# Patient Record
Sex: MALE | Race: WHITE | NOT HISPANIC OR LATINO
[De-identification: names, ages, dates, MRNs, and addresses within clinical notes are randomized per-mention and may not be internally consistent; named-entity substitution may affect disease eponyms.]

---

## 2020-05-18 ENCOUNTER — TRANSCRIPTION ENCOUNTER (OUTPATIENT)
Age: 35
End: 2020-05-18

## 2021-03-03 ENCOUNTER — TRANSCRIPTION ENCOUNTER (OUTPATIENT)
Age: 36
End: 2021-03-03

## 2021-04-14 ENCOUNTER — TRANSCRIPTION ENCOUNTER (OUTPATIENT)
Age: 36
End: 2021-04-14

## 2021-04-29 ENCOUNTER — EMERGENCY (EMERGENCY)
Facility: HOSPITAL | Age: 36
LOS: 0 days | Discharge: HOME | End: 2021-04-29
Attending: EMERGENCY MEDICINE
Payer: COMMERCIAL

## 2021-04-29 VITALS
TEMPERATURE: 98 F | HEIGHT: 67 IN | HEART RATE: 95 BPM | SYSTOLIC BLOOD PRESSURE: 157 MMHG | OXYGEN SATURATION: 97 % | WEIGHT: 147.05 LBS | RESPIRATION RATE: 19 BRPM | DIASTOLIC BLOOD PRESSURE: 94 MMHG

## 2021-04-29 DIAGNOSIS — Z98.890 OTHER SPECIFIED POSTPROCEDURAL STATES: ICD-10-CM

## 2021-04-29 DIAGNOSIS — R10.11 RIGHT UPPER QUADRANT PAIN: ICD-10-CM

## 2021-04-29 PROCEDURE — 71045 X-RAY EXAM CHEST 1 VIEW: CPT | Mod: 26

## 2021-04-29 PROCEDURE — 74177 CT ABD & PELVIS W/CONTRAST: CPT | Mod: 26

## 2021-04-29 PROCEDURE — 99284 EMERGENCY DEPT VISIT MOD MDM: CPT

## 2021-04-29 PROCEDURE — 71045 X-RAY EXAM CHEST 1 VIEW: CPT

## 2021-04-29 PROCEDURE — 74177 CT ABD & PELVIS W/CONTRAST: CPT

## 2021-04-29 PROCEDURE — 99284 EMERGENCY DEPT VISIT MOD MDM: CPT | Mod: 25

## 2021-04-29 NOTE — ED PROVIDER NOTE - OBJECTIVE STATEMENT
pt presents with right upper quadrant pain intermittent achy worse with deep breath 8 days post op liver biospy [facility] sent home same day neg for acute pathology w/u 2/2 chornidc slight elevelated liver enzymes with h/o nml mri and u/s per pt to [facility]. no fevers no sob. no urinalry f/ud.

## 2021-04-29 NOTE — ED PROVIDER NOTE - PATIENT PORTAL LINK FT
You can access the FollowMyHealth Patient Portal offered by [facility] by registering at the following website: [email or web address]. By joining [employer]’s FollowMyHealth portal, you will also be able to view your health information using other applications (apps) compatible with our system.

## 2021-04-29 NOTE — ED ADULT TRIAGE NOTE - CHIEF COMPLAINT QUOTE
liver biopsy last tuesday, woke up tonight with sharp pain to right side abdomen worse with deep inspiration. was told to come to ED for imaging. history of elevated LFTs, ulcerative colitis, rule out primary sclerosing cholangitis.   hepatologist: dr hylton.

## 2021-09-14 ENCOUNTER — TRANSCRIPTION ENCOUNTER (OUTPATIENT)
Age: 36
End: 2021-09-14

## 2021-12-11 ENCOUNTER — TRANSCRIPTION ENCOUNTER (OUTPATIENT)
Age: 36
End: 2021-12-11

## 2022-02-13 ENCOUNTER — EMERGENCY (EMERGENCY)
Facility: HOSPITAL | Age: 37
LOS: 0 days | Discharge: HOME | End: 2022-02-13
Attending: EMERGENCY MEDICINE
Payer: COMMERCIAL

## 2022-02-13 VITALS
DIASTOLIC BLOOD PRESSURE: 73 MMHG | HEART RATE: 64 BPM | OXYGEN SATURATION: 100 % | SYSTOLIC BLOOD PRESSURE: 126 MMHG | TEMPERATURE: 99 F | RESPIRATION RATE: 17 BRPM

## 2022-02-13 VITALS — HEIGHT: 67 IN | WEIGHT: 145.06 LBS

## 2022-02-13 DIAGNOSIS — S09.90XA UNSPECIFIED INJURY OF HEAD, INITIAL ENCOUNTER: ICD-10-CM
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
## 2022-02-13 DIAGNOSIS — S06.0X9A CONCUSSION WITH LOSS OF CONSCIOUSNESS OF UNSPECIFIED DURATION, INITIAL ENCOUNTER: ICD-10-CM

## 2022-02-13 DIAGNOSIS — R51.9 HEADACHE, UNSPECIFIED: ICD-10-CM
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
## 2022-02-13 PROCEDURE — 99284 EMERGENCY DEPT VISIT MOD MDM: CPT

## 2022-02-13 PROCEDURE — 70450 CT HEAD/BRAIN W/O DYE: CPT | Mod: MA

## 2022-02-13 PROCEDURE — 99284 EMERGENCY DEPT VISIT MOD MDM: CPT | Mod: 25

## 2022-02-13 PROCEDURE — 70450 CT HEAD/BRAIN W/O DYE: CPT | Mod: 26,MA

## 2022-02-13 RX ORDER — ACETAMINOPHEN 500 MG
1000 TABLET ORAL ONCE
Refills: 0 | Status: COMPLETED | OUTPATIENT
Start: 2022-02-13 | End: 2022-02-13

## 2022-02-13 NOTE — ED STATDOCS - CLINICAL SUMMARY MEDICAL DECISION MAKING FREE TEXT BOX
Likely concussive. Given persistent symptoms, recurrent trauma, and exertion, will CT head. Much less suspicious for subarachnoid. Dispo pending imaging. Anticipate discharge home. Likely concussive. Given persistent symptoms, recurrent trauma, and exertion, will CT head. Do not suspect SAH.  Pt very well appearing.  Dispo pending imaging. Anticipate discharge home.

## 2022-02-13 NOTE — ED STATDOCS - NSFOLLOWUPINSTRUCTIONS_ED_ALL_ED_FT
Concussion    WHAT YOU NEED TO KNOW:    A concussion is a mild brain injury. It is usually caused by a bump or blow to the head from a fall, a motor vehicle crash, or a sports injury. Sometimes being shaken forcefully may cause a concussion.    DISCHARGE INSTRUCTIONS:    Have someone call 911 for any of the following:     Someone tries to wake you and cannot do so.      You have a seizure, increasing confusion, or a change in personality.      Your speech becomes slurred, or you have new vision problems.    Return to the emergency department if:     You have sudden and new vision problems.      You have a severe headache that does not go away.      You have arm or leg weakness, numbness, or new problems with coordination.      You have blood or clear fluid coming out of the ears or nose.    Contact your healthcare provider if:     You have nausea or are vomiting.      You feel more sleepy than usual.      Your symptoms get worse.      Your symptoms last longer than 6 weeks after the injury.      You have questions or concerns about your condition or care.    Medicines: You may need any of the following:     Acetaminophen decreases pain and fever. It is available without a doctor's order. Ask how much to take and how often to take it. Follow directions. Read the labels of all other medicines you are using to see if they also contain acetaminophen, or ask your doctor or pharmacist. Acetaminophen can cause liver damage if not taken correctly. Do not use more than 4 grams (4,000 milligrams) total of acetaminophen in one day.       NSAIDs help decrease swelling and pain or fever. This medicine is available with or without a doctor's order. NSAIDs can cause stomach bleeding or kidney problems in certain people. If you take blood thinner medicine, always ask your healthcare provider if NSAIDs are safe for you. Always read the medicine label and follow directions.      Take your medicine as directed. Contact your healthcare provider if you think your medicine is not helping or if you have side effects. Tell him or her if you are allergic to any medicine. Keep a list of the medicines, vitamins, and herbs you take. Include the amounts, and when and why you take them. Bring the list or the pill bottles to follow-up visits. Carry your medicine list with you in case of an emergency.    Self-care: Concussion symptoms usually go away within about 10 days, but they may last longer. The following may be recommended to manage your symptoms:     Rest from physical and mental activities as directed. Mental activities are those that require thinking, concentration, and attention. You will need to rest until your symptoms are gone. Rest will allow you to recover from your concussion. Ask your healthcare provider when you can return to work and other daily activities.      Have someone stay with you for the first 24 hours after your injury. Your healthcare provider should be contacted if your symptoms get worse, or you develop new symptoms.      Do not participate in sports and physical activities until your healthcare provider says it is okay. They could make your symptoms worse or lead to another concussion. Your healthcare provider will tell you when it is okay for you to return to sports or physical activities. Ask for more information about sports concussions.    Prevent another concussion:     Wear protective sports equipment that fits properly. Helmets help decrease your risk for a serious brain injury. Talk to your healthcare provider about ways you can decrease your risk for a concussion if you play sports.      Wear your seatbelt every time you travel. This helps to decrease your risk for a head injury if you are in a car accident.     Follow up with your healthcare provider as directed: Write down your questions so you remember to ask them during your visits.     FOLLOW UP EVALUATION  To ensure optimal concussion recovery, follow up with a doctor specialized in concussion management. An evaluation by a specialty concussion program can ensure timely return to activities.    We offer appointments through the [facility]ussion [facility]’s Hotline at [phone].     Head Injury    WHAT YOU NEED TO KNOW:    A head injury is most often caused by a blow to the head. This may occur from a fall, bicycle injury, sports injury, being struck in the head, or a motor vehicle accident.     DISCHARGE INSTRUCTIONS:    Call 911 or have someone else call for any of the following:     You cannot be woken.      You have a seizure.      You stop responding to others or you faint.      You have blurry or double vision.      Your speech becomes slurred or confused.      You have arm or leg weakness, loss of feeling, or new problems with coordination.      Your pupils are larger than usual or one pupil is a different size than the other.       You have blood or clear fluid coming out of your ears or nose.    Return to the emergency department if:     You have repeated or forceful vomiting.      You feel confused.      Your headache gets worse or becomes severe.      You or someone caring for you notices that you are harder to wake than usual.    Contact your healthcare provider if:     Your symptoms last longer than 6 weeks after the injury.      You have questions or concerns about your condition or care.    Medicines:     Acetaminophen decreases pain. Acetaminophen is available without a doctor's order. Ask how much to take and how often to take it. Follow directions. Acetaminophen can cause liver damage if not taken correctly.      Take your medicine as directed. Contact your healthcare provider if you think your medicine is not helping or if you have side effects. Tell him or her if you are allergic to any medicine. Keep a list of the medicines, vitamins, and herbs you take. Include the amounts, and when and why you take them. Bring the list or the pill bottles to follow-up visits. Carry your medicine list with you in case of an emergency.    Self-care:     Rest or do quiet activities for 24 to 48 hours. Limit your time watching TV, using the computer, or doing tasks that require a lot of thinking. Slowly return to your normal activities as directed. Do not play sports or do activities that may cause you to get hit in the head. Ask your healthcare provider when you can return to sports.       Apply ice on your head for 15 to 20 minutes every hour or as directed. Use an ice pack, or put crushed ice in a plastic bag. Cover it with a towel before you apply it to your skin. Ice helps prevent tissue damage and decreases swelling and pain.       Have someone stay with you for 24 hours or as directed. This person can monitor you for complications and call 911. When you are awake the person should ask you a few questions to see if you are thinking clearly. An example would be to ask your name or your address.     Prevent another head injury:     Wear a helmet that fits properly. Do this when you play sports, or ride a bike, scooter, or skateboard. Helmets help decrease your risk of a serious head injury. Talk to your healthcare provider about other ways you can protect yourself if you play sports.      Wear your seat belt every time you are in a car. This helps to decrease your risk for a head injury if you are in a car accident.     Follow up with your healthcare provider as directed: Write down your questions so you remember to ask them during your visits.

## 2022-02-13 NOTE — ED STATDOCS - PATIENT PORTAL LINK FT
You can access the FollowMyHealth Patient Portal offered by [facility] by registering at the following website: [email or web address]. By joining [employer]’s FollowMyHealth portal, you will also be able to view your health information using other applications (apps) compatible with our system.

## 2022-02-13 NOTE — ED ADULT NURSE NOTE - OBJECTIVE STATEMENT
PT to ED from home for c/o headache and concussion like symptoms. PT reports having 2 accidental falls over the last 2 weeks in which he hit his head. PT states that yesterday he was confusing words in his sentences and was concerned. PT a&ox4, GCS 15. Denies vision changes, weakness, numbness , tingling, confusion, Nausea and vomiting.

## 2022-02-13 NOTE — ED ADULT TRIAGE NOTE - CHIEF COMPLAINT QUOTE
Pt comes to the ED complaining of head injury. Pt was snowboarding 1/29 when he face planted. Pt was able to shake it off and continued to board. On 2/5 patient was snowboarding again and fell and jostled his head. Pt states that he was able to "shake it off". Pt states that on Wednesday this week, he was at the climbing gym and fell back and was caught by the rope but the jostle caused him to have an headache. Pt states that he has had a headache since Wednesday and that with any kind of physical exertion the headache gets worse. Pt states that at times his vision does not feel as sharp, despite having new glasses. Pt A&Ox4, able to ambulate without assistance.

## 2022-02-13 NOTE — ED STATDOCS - NS ED ROS FT
Constitutional: nad, well appearing  HEENT:  no nasal congestion, eye drainage or ear pain.    CVS:  no cp  Resp:  No sob, no cough  GI:  no abdominal pain, no nausea or vomiting  :  no dysuria  MSK: no joint pain or limited ROM  Skin: no rash  Neuro: no change in mental status or level of consciousness, +headache  Heme/lymph: no bleeding

## 2022-02-13 NOTE — ED STATDOCS - OBJECTIVE STATEMENT
38 y/o male with no significant PMHx presents to the ED c/o exertional headaches x few days. Pt notes on 01/29/2022 he fell forward and hit his face while snowboarding with no LOC and then pt fell and tumbled again while snowboarding on 02/05 with no LOC. Then, on 02/09, pt was at a climbing gym, fell back, and was caught by a top rope. Over this period, pt reports he initially had slight headaches and has noted over the past few days, headaches come on after physical activity & sexual intercourse. Pt has a history of a concussion in college. Denies other injury or symptoms. No other complaints at this time.

## 2022-02-13 NOTE — ED STATDOCS - PROGRESS NOTE DETAILS
Patient seen and evaluated, ED attending note and orders reviewed, will continue with patient follow up and care -Nasima Akhtar PA-C CT head with no acute findings pt with likely concussion, S&S, risks and precautions discussed, advised rest and avoiding further activities with head injury potential, pt verbalized understanding, also discussed [facility] concussion program f/u  Nasima Akhtar PA-C

## 2022-02-16 PROBLEM — Z00.00 ENCOUNTER FOR PREVENTIVE HEALTH EXAMINATION: Status: ACTIVE | Noted: 2022-02-16

## 2022-02-24 ENCOUNTER — APPOINTMENT (OUTPATIENT)
Dept: NEUROSURGERY | Facility: CLINIC | Age: 37
End: 2022-02-24
Payer: COMMERCIAL

## 2022-02-24 VITALS
DIASTOLIC BLOOD PRESSURE: 79 MMHG | OXYGEN SATURATION: 98 % | BODY MASS INDEX: 23.49 KG/M2 | HEART RATE: 64 BPM | WEIGHT: 155 LBS | HEIGHT: 68 IN | SYSTOLIC BLOOD PRESSURE: 132 MMHG | TEMPERATURE: 96.3 F

## 2022-02-24 DIAGNOSIS — Z83.79 FAMILY HISTORY OF OTHER DISEASES OF THE DIGESTIVE SYSTEM: ICD-10-CM

## 2022-02-24 DIAGNOSIS — Z87.891 PERSONAL HISTORY OF NICOTINE DEPENDENCE: ICD-10-CM

## 2022-02-24 DIAGNOSIS — Z83.49 FAMILY HISTORY OF OTHER ENDOCRINE, NUTRITIONAL AND METABOLIC DISEASES: ICD-10-CM

## 2022-02-24 DIAGNOSIS — Z82.49 FAMILY HISTORY OF ISCHEMIC HEART DISEASE AND OTHER DISEASES OF THE CIRCULATORY SYSTEM: ICD-10-CM

## 2022-02-24 PROCEDURE — 99204 OFFICE O/P NEW MOD 45 MIN: CPT

## 2022-02-24 RX ORDER — MESALAMINE 1.2 G/1
1.2 TABLET, DELAYED RELEASE ORAL
Refills: 0 | Status: ACTIVE | COMMUNITY

## 2022-02-24 NOTE — CONSULT LETTER
[Dear  ___] : Dear  [unfilled], [Courtesy Letter:] : I had the pleasure of seeing your patient, [unfilled], in my office today. [Sincerely,] : Sincerely, [FreeTextEntry1] : Taurus Champion is a 37-year-old male who presents for evaluation of concussion.  Patient with history of previous concussions.  Patient indicates a 1/29/2022 patient was snowboarding when he had face planted onto soft snow.  Patient indicates he was wearing a helmet.  Patient denies loss of consciousness or seizure-like activity.  He remembers events happening before and after the injury.  Patient indicates a week later he had went snowboarding again.  He fell on soft snow however did not strike his head.  He reports he felt pain headache shortly after.  He had then proceeded to go to a climbing gym.  Although he did not have a fall or strike his head he had felt disoriented and noted some difficulties with speech shortly after.  Patient was evaluated at [facility] on 2/13/2022.  He had underwent CT of the head which indicated no skull fracture, hemorrhage or edema.\par \par Patient overall symptoms are improving.  He reports headaches.  He takes Tylenol as needed.  Unable to take NSAIDs due to ulcerative colitis.  Patient endorses noise sensitivity, nausea, and neck pain.  He reports dizziness however this is improved.  He denies light sensitivity, difficulty with sleeping, mood changes, like feeling in a fog, confusion, difficulties with concentration or memory.  Patient reports he has returned to work.  He has a desk job.  Patient indicates he has not had any difficulties with using a computer screen.  Patient reports visual changes.  He reports prolonged staring at objects creates a "3D dimension."  This has improved.\par \par Patient appears in no acute distress. He is alert and oriented to time and date. When given a list of 5 words he was able to recite all 5 immediately after. After a brief pause, he was able to recite all 5 words again. After another pause, he was able to recite 5 out of the 5 words. He was able to recite up to 4 numbers in reverse order without difficulty. He was also able to recite the months in reverse order without difficulty. Patient displays full cervical range of motion. Cervical tightness noted. Motor and sensory exam intact. Deep tendon reflexes intact. Motor coordination as evidenced by finger to nose testing intact. Cranial nerves intact. Pupils equal and reactive to light. Hearing intact. No facial droop noted. Tongue protrudes in the midline. Facial sensation and movement symmetric and normal. Strength equal and normal to bilateral upper and lower extremities.  No saccades or nystagmus noted. Normal vestibular ocular reflex. Double vision elicited with head turns on fixed point and dizziness noted with head turns with ambulation. Patient does  not display difficulties with tandem stance.  He does not display any difficulties with tandem walk. He is able to perform double and single leg stance without difficulty. Negative pronator drift. Negative Romberg's. Patient was able to recall 4 out of the 5 words after a 10 minute pause. Orientation score 5/5, Immediate memory score 15/15, concentration score 3/5, delayed memory score 4/5. Total cognitive inefficiency score 27/30. \par \par The patient display symptoms most consistent with a concussion. I reiterated to the patient that it may take between 6-8 weeks to fully recover from a concussion. I've encouraged patient to continue to rest and avoid stimulations such as noise which may trigger symptoms and delay recovery.  I have also provided the patient with vestibular  therapy as he feels slightly off balance.  I have made recommendations to followup with a neuro-ophthalmologist. At this time i have advised patient to avoid any sports or activities that may put him at risk of another head injury. We will followup in 3-4 weeks. I have encouraged patient to call with any further questions or concerns or with any new or worsening symptoms.\par \par \par \par  [FreeTextEntry3] : Cassie Drew, MSN, FNP-BC\par Department of Neurosurgery \par [facility]\par [address] [place] [address], 2nd floor\[place] [address]\par Office: [phone]\par Fax: [phone]\par

## 2022-03-01 ENCOUNTER — NON-APPOINTMENT (OUTPATIENT)
Age: 37
End: 2022-03-01

## 2022-03-25 ENCOUNTER — APPOINTMENT (OUTPATIENT)
Dept: NEUROSURGERY | Facility: CLINIC | Age: 37
End: 2022-03-25
Payer: COMMERCIAL

## 2022-03-25 VITALS
TEMPERATURE: 96.5 F | HEART RATE: 77 BPM | HEIGHT: 68 IN | BODY MASS INDEX: 23.49 KG/M2 | DIASTOLIC BLOOD PRESSURE: 84 MMHG | OXYGEN SATURATION: 98 % | SYSTOLIC BLOOD PRESSURE: 124 MMHG | WEIGHT: 155 LBS

## 2022-03-25 DIAGNOSIS — H53.9 UNSPECIFIED VISUAL DISTURBANCE: ICD-10-CM

## 2022-03-25 DIAGNOSIS — R26.89 OTHER ABNORMALITIES OF GAIT AND MOBILITY: ICD-10-CM

## 2022-03-25 PROCEDURE — 99215 OFFICE O/P EST HI 40 MIN: CPT

## 2022-03-25 NOTE — CONSULT LETTER
[Dear  ___] : Dear  [unfilled], [Courtesy Letter:] : I had the pleasure of seeing your patient, [unfilled], in my office today. [Sincerely,] : Sincerely, [FreeTextEntry1] : I had the pleasure of seeing your patient, CRYSTAL CHAMPION, in my office today. Crystal Champion is a 37-year-old male who presents for evaluation of concussion. Patient with history of previous concussions. Patient indicates a 1/29/2022 patient was snowboarding when he had face planted onto soft snow. Patient indicates he was wearing a helmet. Patient denies loss of consciousness or seizure-like activity. He remembers events happening before and after the injury. Patient indicates a week later he had went snowboarding again. He fell on soft snow however did not strike his head. He reports he felt pain headache shortly after. He had then proceeded to go to a climbing gym. Although he did not have a fall or strike his head he had felt disoriented and noted some difficulties with speech shortly after. Patient was evaluated at [facility] on 2/13/2022. He had underwent CT of the head which indicated no skull fracture, hemorrhage or edema.\par \par Patient reports feeling 65% like himself again.  Patient reports frontal headaches.  Worsen with activity.  Patient is unable to take NSAIDs.  He reports taking Tylenol as needed with minimal benefit.  Patient reports neck pain which is improving with physical therapy.  He reports dizziness which is improving with vestibular therapy.  He reports some nausea, sleeping more than usual, and nervousness and anxiety.  Patient denies difficulties with concentration or memory, confusion, feeling in a fog, and light sensitivity.  Patient has returned to work as a  without any difficulties.  Patient scored a total 10 on symptom profile. Patient reports he was evaluated by a neuro-ophthalmologist.  Patient reports he was recommended lenses however he did not pursue this as this provided him with very minimal benefit for a large cost.\par \par Patient appears in no acute distress. He is alert and oriented to time and date. When given a list of 5 words he was able to recite all 5 immediately after. After a brief pause, he was able to recite all 5 words again. After another pause, he was able to recite 5 out of the 5 words. He was able to recite up to 4 numbers in reverse order without difficulty. He was also able to recite the months in reverse order without difficulty. Patient displays full cervical range of motion. Cervical tightness noted. Motor and sensory exam intact. Deep tendon reflexes intact. Motor coordination as evidenced by finger to nose testing intact. Cranial nerves intact. Pupils equal and reactive to light. Hearing intact. No facial droop noted. Tongue protrudes in the midline. Facial sensation and movement symmetric and normal. Strength equal and normal to bilateral upper and lower extremities. No saccades or nystagmus noted. Normal vestibular ocular reflex. Head pressure elicited with head turns on fixed point and dizziness noted with head turns with ambulation. Patient does not display difficulties with tandem stance. He does not display any difficulties with tandem walk. He is able to perform double and single leg stance without difficulty. Negative pronator drift. Negative Romberg's. Patient was able to recall 4 out of the 5 words after a 10 minute pause. Orientation score 5/5, Immediate memory score 15/15, concentration score 5/5, delayed memory score 3/5. Total cognitive inefficiency score 28/30. \par \par Patient is recovering well.  Encouraged patient to continue with vestibular therapy and continue FU with neuro- opthalmology. Provided patient with referral for neurology for headaches. Provided pt with referral for massage and acupuncture.  We will FU in 6 weeks. I have encouraged patient to call with any further questions or concerns or with any new or worsening symptoms.\par \par  [FreeTextEntry3] : Cassie Drew, MSN, FNP-BC\par Nurse Practitioner\par Neurosurgery\par [address] [place] [address], 2nd floor \[place] [address] \par Office: [phone] \par Fax: [phone]\par \par

## 2022-04-12 ENCOUNTER — NON-APPOINTMENT (OUTPATIENT)
Age: 37
End: 2022-04-12

## 2022-04-25 ENCOUNTER — EMERGENCY (EMERGENCY)
Facility: HOSPITAL | Age: 37
LOS: 0 days | Discharge: HOME | End: 2022-04-25
Attending: EMERGENCY MEDICINE
Payer: COMMERCIAL

## 2022-04-25 VITALS
SYSTOLIC BLOOD PRESSURE: 123 MMHG | HEART RATE: 74 BPM | RESPIRATION RATE: 18 BRPM | HEIGHT: 67 IN | TEMPERATURE: 98 F | DIASTOLIC BLOOD PRESSURE: 75 MMHG | OXYGEN SATURATION: 99 % | WEIGHT: 152.12 LBS

## 2022-04-25 DIAGNOSIS — R00.2 PALPITATIONS: ICD-10-CM

## 2022-04-25 DIAGNOSIS — Z87.820 PERSONAL HISTORY OF TRAUMATIC BRAIN INJURY: ICD-10-CM

## 2022-04-25 DIAGNOSIS — I44.4 LEFT ANTERIOR FASCICULAR BLOCK: ICD-10-CM

## 2022-04-25 DIAGNOSIS — R61 GENERALIZED HYPERHIDROSIS: ICD-10-CM

## 2022-04-25 DIAGNOSIS — R20.2 PARESTHESIA OF SKIN: ICD-10-CM

## 2022-04-25 DIAGNOSIS — K51.90 ULCERATIVE COLITIS, UNSPECIFIED, WITHOUT COMPLICATIONS: ICD-10-CM

## 2022-04-25 LAB
ALBUMIN SERPL ELPH-MCNC: 4.1 G/DL — SIGNIFICANT CHANGE UP (ref 3.3–5)
ALP SERPL-CCNC: 101 U/L — SIGNIFICANT CHANGE UP (ref 40–120)
ALT FLD-CCNC: 65 U/L — SIGNIFICANT CHANGE UP (ref 12–78)
ANION GAP SERPL CALC-SCNC: 6 MMOL/L — SIGNIFICANT CHANGE UP (ref 5–17)
AST SERPL-CCNC: 27 U/L — SIGNIFICANT CHANGE UP (ref 15–37)
BASOPHILS # BLD AUTO: 0.02 K/UL — SIGNIFICANT CHANGE UP (ref 0–0.2)
BASOPHILS NFR BLD AUTO: 0.3 % — SIGNIFICANT CHANGE UP (ref 0–2)
BILIRUB SERPL-MCNC: 0.5 MG/DL — SIGNIFICANT CHANGE UP (ref 0.2–1.2)
BUN SERPL-MCNC: 13 MG/DL — SIGNIFICANT CHANGE UP (ref 7–23)
CALCIUM SERPL-MCNC: 9.2 MG/DL — SIGNIFICANT CHANGE UP (ref 8.5–10.1)
CHLORIDE SERPL-SCNC: 108 MMOL/L — SIGNIFICANT CHANGE UP (ref 96–108)
CO2 SERPL-SCNC: 29 MMOL/L — SIGNIFICANT CHANGE UP (ref 22–31)
CREAT SERPL-MCNC: 0.9 MG/DL — SIGNIFICANT CHANGE UP (ref 0.5–1.3)
D DIMER BLD IA.RAPID-MCNC: <150 NG/ML DDU — SIGNIFICANT CHANGE UP
EGFR: 114 ML/MIN/1.73M2 — SIGNIFICANT CHANGE UP
EOSINOPHIL # BLD AUTO: 0.12 K/UL — SIGNIFICANT CHANGE UP (ref 0–0.5)
EOSINOPHIL NFR BLD AUTO: 2 % — SIGNIFICANT CHANGE UP (ref 0–6)
GLUCOSE SERPL-MCNC: 93 MG/DL — SIGNIFICANT CHANGE UP (ref 70–99)
HCT VFR BLD CALC: 48.4 % — SIGNIFICANT CHANGE UP (ref 39–50)
HGB BLD-MCNC: 16.2 G/DL — SIGNIFICANT CHANGE UP (ref 13–17)
HIV 1 & 2 AB SERPL IA.RAPID: SIGNIFICANT CHANGE UP
IMM GRANULOCYTES NFR BLD AUTO: 0.2 % — SIGNIFICANT CHANGE UP (ref 0–1.5)
LYMPHOCYTES # BLD AUTO: 1.71 K/UL — SIGNIFICANT CHANGE UP (ref 1–3.3)
LYMPHOCYTES # BLD AUTO: 29 % — SIGNIFICANT CHANGE UP (ref 13–44)
MCHC RBC-ENTMCNC: 30.8 PG — SIGNIFICANT CHANGE UP (ref 27–34)
MCHC RBC-ENTMCNC: 33.5 GM/DL — SIGNIFICANT CHANGE UP (ref 32–36)
MCV RBC AUTO: 92 FL — SIGNIFICANT CHANGE UP (ref 80–100)
MONOCYTES # BLD AUTO: 0.67 K/UL — SIGNIFICANT CHANGE UP (ref 0–0.9)
MONOCYTES NFR BLD AUTO: 11.4 % — SIGNIFICANT CHANGE UP (ref 2–14)
NEUTROPHILS # BLD AUTO: 3.36 K/UL — SIGNIFICANT CHANGE UP (ref 1.8–7.4)
NEUTROPHILS NFR BLD AUTO: 57.1 % — SIGNIFICANT CHANGE UP (ref 43–77)
PCP SPEC-MCNC: SIGNIFICANT CHANGE UP
PLATELET # BLD AUTO: 239 K/UL — SIGNIFICANT CHANGE UP (ref 150–400)
POTASSIUM SERPL-MCNC: 3.9 MMOL/L — SIGNIFICANT CHANGE UP (ref 3.5–5.3)
POTASSIUM SERPL-SCNC: 3.9 MMOL/L — SIGNIFICANT CHANGE UP (ref 3.5–5.3)
PROT SERPL-MCNC: 7.8 GM/DL — SIGNIFICANT CHANGE UP (ref 6–8.3)
RBC # BLD: 5.26 M/UL — SIGNIFICANT CHANGE UP (ref 4.2–5.8)
RBC # FLD: 12.3 % — SIGNIFICANT CHANGE UP (ref 10.3–14.5)
SODIUM SERPL-SCNC: 143 MMOL/L — SIGNIFICANT CHANGE UP (ref 135–145)
TROPONIN I, HIGH SENSITIVITY RESULT: <3 NG/L — SIGNIFICANT CHANGE UP
WBC # BLD: 5.89 K/UL — SIGNIFICANT CHANGE UP (ref 3.8–10.5)
WBC # FLD AUTO: 5.89 K/UL — SIGNIFICANT CHANGE UP (ref 3.8–10.5)

## 2022-04-25 PROCEDURE — 86703 HIV-1/HIV-2 1 RESULT ANTBDY: CPT

## 2022-04-25 PROCEDURE — 99284 EMERGENCY DEPT VISIT MOD MDM: CPT

## 2022-04-25 PROCEDURE — 36415 COLL VENOUS BLD VENIPUNCTURE: CPT

## 2022-04-25 PROCEDURE — 80307 DRUG TEST PRSMV CHEM ANLYZR: CPT

## 2022-04-25 PROCEDURE — 99285 EMERGENCY DEPT VISIT HI MDM: CPT

## 2022-04-25 PROCEDURE — 93005 ELECTROCARDIOGRAM TRACING: CPT

## 2022-04-25 PROCEDURE — 85025 COMPLETE CBC W/AUTO DIFF WBC: CPT

## 2022-04-25 PROCEDURE — 93010 ELECTROCARDIOGRAM REPORT: CPT

## 2022-04-25 PROCEDURE — 80053 COMPREHEN METABOLIC PANEL: CPT

## 2022-04-25 PROCEDURE — 84484 ASSAY OF TROPONIN QUANT: CPT

## 2022-04-25 PROCEDURE — 85379 FIBRIN DEGRADATION QUANT: CPT

## 2022-04-25 RX ORDER — SODIUM CHLORIDE 9 MG/ML
500 INJECTION INTRAMUSCULAR; INTRAVENOUS; SUBCUTANEOUS ONCE
Refills: 0 | Status: COMPLETED | OUTPATIENT
Start: 2022-04-25 | End: 2022-04-25

## 2022-04-25 RX ADMIN — SODIUM CHLORIDE 500 MILLILITER(S): 9 INJECTION INTRAMUSCULAR; INTRAVENOUS; SUBCUTANEOUS at 08:28

## 2022-04-25 NOTE — ED ADULT NURSE REASSESSMENT NOTE - NS ED NURSE REASSESS COMMENT FT1
Resumed care from VIRGIE Arreguin. Patient complained of feeling anxious awaiting his blood work results. Plan of care discussed with patient. Safety and comfort measures maintained. Call bell within reach.

## 2022-04-25 NOTE — ED PROVIDER NOTE - OBJECTIVE STATEMENT
38 yo WM, h/o ulcerative colitis poorly compliant with meds, s/p concussion 1/2022, ambulatory to ED c/o'ing palpitations this AM.  Pt awoke ~ 05:45 due to feeling tingling sensation over diffuse torso associated with palpitations.  + Associated mild diaphoresis.  No associated cp, SOB, abd pain, N/V, lightheaded, F/C, HA.  Pt has a heart monitor from PT as part of his PT for the concussion, monitor noted brief tachycardia up to 150.  Pt presently asymptomatic.  Pt denies any recent bedridden, immobility, extended travel hx past 3 - 4 mos.  PCP: Naomi     Pt has never seen cardio MD before.

## 2022-04-25 NOTE — ED PROVIDER NOTE - MUSCULOSKELETAL, MLM
Spine appears normal, range of motion is not limited, no muscle or joint tenderness.  ESTRELLA x 4, no focal extremity swelling, tender.

## 2022-04-25 NOTE — ED PROVIDER NOTE - PATIENT PORTAL LINK FT
You can access the FollowMyHealth Patient Portal offered by [facility] by registering at the following website: [email or web address]. By joining [employer]’s FollowMyHealth portal, you will also be able to view your health information using other applications (apps) compatible with our system.

## 2022-04-25 NOTE — ED PROVIDER NOTE - MUSCULOSKELETAL NEGATIVE STATEMENT, MLM
Medication refill received and approved and sent back to pharmacy- for atorvastatin   Patient was last seen in the office on 12/16/2020 via video visit.  Lisinoril refused - discontinued on 11/13/2020.   no back pain, no gout, no musculoskeletal pain, no neck pain, and no weakness.

## 2022-04-25 NOTE — ED ADULT NURSE NOTE - CHIEF COMPLAINT
CALLED PATIENT BACK, COULD NOT CONNECT.  CAN SCHEDULE WITH NEXT AVAILABLE.   
Provider: JIMENEZ DIAZ  Caller: JONATHAN GONZALES  Relationship to Patient: SELF    Reason for Call: PT CALLING STATING THAT SHE RECEIVED A LETTER STATING THAT  IS NO LONGER AT THE PRACTICE AND THAT SHE CAN SEE ONE OF THE JIMENEZ'S IN THE OFFICE.  PT STATES THAT OK SHE HAD SEEN JIMENEZ DIAZ THE LAST TIME THAT HE WAS NOT AVAILABLE.    IF YOU HAVE ANY QUESTIONS YOU Can [phone]    
The patient is a 37y Male complaining of see chief complaint quote.

## 2022-04-25 NOTE — ED PROVIDER NOTE - CARE PROVIDER_API CALL
Chao Manzanares (MD)  Cardiovascular Disease  [address], [address]  [place] [address]  Phone: [phone]  Fax: [phone]  Follow Up Time:

## 2022-04-25 NOTE — ED ADULT TRIAGE NOTE - CHIEF COMPLAINT QUOTE
Pt presents to ED for tachycardia. Pt states he woke up this morning and noticed that his HR was elevated throughout the night. Symptoms include palpations and nausea.

## 2022-04-25 NOTE — ED PROVIDER NOTE - NSFOLLOWUPINSTRUCTIONS_ED_ALL_ED_FT
Rest.  Avoid stressful situations.  Follow up this week with own regular doctor.  Follow up with cardiologist.        Palpitations      Palpitations are feelings that your heartbeat is irregular or is faster than normal. It may feel like your heart is fluttering or skipping a beat. Palpitations are usually not a serious problem. They may be caused by many things, including smoking, caffeine, alcohol, stress, and certain medicines or drugs. Most causes of palpitations are not serious. However, some palpitations can be a sign of a serious problem. You may need further tests to rule out serious medical problems.      Follow these instructions at home:                  Pay attention to any changes in your condition. Take these actions to help manage your symptoms:    Eating and drinking   •Avoid foods and drinks that may cause palpitations. These may include:  •Caffeinated coffee, tea, soft drinks, diet pills, and energy drinks.      •Chocolate.      •Alcohol.        Lifestyle   •Take steps to reduce your stress and anxiety. Things that can help you relax include:  •Yoga.      •Mind-body activities, such as deep breathing, meditation, or using words and images to create positive thoughts (guided imagery).      •Physical activity, such as swimming, jogging, or walking. Tell your health care provider if your palpitations increase with activity. If you have chest pain or shortness of breath with activity, do not continue the activity until you are seen by your health care provider.      •Biofeedback. This is a method that helps you learn to use your mind to control things in your body, such as your heartbeat.        • Do not use drugs, including cocaine or ecstasy. Do not use marijuana.      •Get plenty of rest and sleep. Keep a regular bed time.      General instructions     •Take over-the-counter and prescription medicines only as told by your health care provider.      • Do not use any products that contain nicotine or tobacco, such as cigarettes and e-cigarettes. If you need help quitting, ask your health care provider.      •Keep all follow-up visits as told by your health care provider. This is important. These may include visits for further testing if palpitations do not go away or get worse.        Contact a health care provider if you:    •Continue to have a fast or irregular heartbeat after 24 hours.      •Notice that your palpitations occur more often.        Get help right away if you:    •Have chest pain or shortness of breath.      •Have a severe headache.      •Feel dizzy or you faint.        Summary    •Palpitations are feelings that your heartbeat is irregular or is faster than normal. It may feel like your heart is fluttering or skipping a beat.      •Palpitations may be caused by many things, including smoking, caffeine, alcohol, stress, certain medicines, and drugs.      •Although most causes of palpitations are not serious, some causes can be a sign of a serious medical problem.      •Get help right away if you faint or have chest pain, shortness of breath, a severe headache, or dizziness.      This information is not intended to replace advice given to you by your health care provider. Make sure you discuss any questions you have with your health care provider.

## 2022-04-25 NOTE — ED PROVIDER NOTE - CLINICAL SUMMARY MEDICAL DECISION MAKING FREE TEXT BOX
38 yo WM, h/o ulcerative colitis poorly compliant with meds, s/p concussion 1/2022, ambulatory to ED c/o'ing palpitations this AM.  Pt awoke ~ 05:45 due to feeling tingling sensation over diffuse torso associated with palpitations.  + Associated mild diaphoresis.  No associated cp, SOB, abd pain, N/V, lightheaded, F/C, HA.  Pt has a heart monitor from PT as part of his PT for the concussion, monitor noted brief tachycardia up to 150.  Pt presently asymptomatic.  VSS, P/E unremarkable.  Plan: EKG, labs incl. Tox screen, DD (no active risk factors for VTE dz.), Troponin; observe, monitor, reassess.

## 2022-04-25 NOTE — ED ADULT NURSE NOTE - OBJECTIVE STATEMENT
Pt states that "I woke up 4:45 with a tingling flush feeling and my heart rate was fast." Pt wears a polar heart rate monitor it read as high as 147 as per patient. Pt states that pt had a concussion in February and has worn the heart monitor while at physical therapy since then.

## 2022-04-25 NOTE — ED PROVIDER NOTE - PROGRESS NOTE DETAILS
MANJU Gandhi MD:  Labs negative, incl. troponin & D-dimer, pt w/o chest pain; cardiac monitor + NSR while in ED.  Pt asymptomatic, agreeable with D/c home, outpt cardiology f/u.

## 2022-05-02 ENCOUNTER — APPOINTMENT (OUTPATIENT)
Dept: NEUROSURGERY | Facility: CLINIC | Age: 37
End: 2022-05-02
Payer: COMMERCIAL

## 2022-05-02 VITALS
HEIGHT: 68 IN | HEART RATE: 76 BPM | BODY MASS INDEX: 22.88 KG/M2 | OXYGEN SATURATION: 99 % | SYSTOLIC BLOOD PRESSURE: 125 MMHG | WEIGHT: 151 LBS | TEMPERATURE: 98.1 F | DIASTOLIC BLOOD PRESSURE: 80 MMHG

## 2022-05-02 DIAGNOSIS — M79.601 PAIN IN RIGHT ARM: ICD-10-CM

## 2022-05-02 DIAGNOSIS — R45.89 OTHER SYMPTOMS AND SIGNS INVOLVING EMOTIONAL STATE: ICD-10-CM

## 2022-05-02 DIAGNOSIS — M79.602 PAIN IN RIGHT ARM: ICD-10-CM

## 2022-05-02 PROBLEM — S06.0X9A CONCUSSION WITH LOSS OF CONSCIOUSNESS OF UNSPECIFIED DURATION, INITIAL ENCOUNTER: Chronic | Status: ACTIVE | Noted: 2022-04-25

## 2022-05-02 PROBLEM — K51.90 ULCERATIVE COLITIS, UNSPECIFIED, WITHOUT COMPLICATIONS: Chronic | Status: ACTIVE | Noted: 2022-04-25

## 2022-05-02 PROCEDURE — 99215 OFFICE O/P EST HI 40 MIN: CPT

## 2022-05-06 ENCOUNTER — NON-APPOINTMENT (OUTPATIENT)
Age: 37
End: 2022-05-06

## 2022-05-12 NOTE — CONSULT LETTER
[Dear  ___] : Dear  [unfilled], [Courtesy Letter:] : I had the pleasure of seeing your patient, [unfilled], in my office today. [Sincerely,] : Sincerely, [FreeTextEntry1] : I had the pleasure of seeing your patient, CRYSTAL CHAMPION, in my office today. I had the pleasure of seeing your patient, CRYSTAL CHAMPION, in my office today. I had the pleasure of seeing your patient, CRYSTAL CHAMPION, in my office today. Crystal Champion is a 37-year-old male who presents for evaluation of concussion. Patient with history of previous concussions. Patient indicates a 1/29/2022 patient was snowboarding when he had face planted onto soft snow. Patient indicates he was wearing a helmet. Patient denies loss of consciousness or seizure-like activity. He remembers events happening before and after the injury. Patient indicates a week later he had went snowboarding again. He fell on soft snow however did not strike his head. He reports he felt pain headache shortly after. He had then proceeded to go to a climbing gym. Although he did not have a fall or strike his head he had felt disoriented and noted some difficulties with speech shortly after. Patient was evaluated at [facility] on 2/13/2022. He had underwent CT of the head which indicated no skull fracture, hemorrhage or edema.\par \par Patient reports feeling 60% like himself again. Patient reports headaches and dizziness has improved. He has completed 6 weeks of vestibular therapy for his cervical spine. Patient continues to report cervical tightness and stiffness with a sharp pain to bilateral forearms. He reports nausea, noise sensitivity, more tired than usual and anxiety. Patient reports he was seen at [facility] approximately 1 week ago for elevated heart rate. He had a cardiac work-up which was negative as per patient. Patient reports he was also seen by his primary care physician who has provided him with Xanax which provides him with benefit. He denies any upper extremity numbness, tingling or weakness or any dropping of objects from his hands or difficulties with dexterity. He denies any difficulties with walking or tripping over his feet. Patient denies light sensitivity, unbalanced, feeling like in a fog, confusion, or difficulties with concentration or memory. Patient has been undergoing acupuncture therapy for his neck with benefit noted. Patient indicates he was evaluated by neurologist.  He had received 2 medications for headaches.  Patient reports one of the medications did not help.  He has not tried the second one yet due to side effects of drowsiness.  Patient reports he feels steady on a mountain bike.\par \par \par Patient appears in no acute distress. He is alert and oriented to time and date. When given a list of 5 words he was able to recite all 5 immediately after. After a brief pause, he was able to recite all 5 words again. After another pause, he was able to recite 5 out of the 5 words. He was able to recite up to 5 numbers in reverse order without difficulty. He was also able to recite the months in reverse order without difficulty. Patient displays full cervical range of motion. Cervical tightness noted. Motor and sensory exam intact. Deep tendon reflexes intact. Motor coordination as evidenced by finger to nose testing intact. Cranial nerves intact. Pupils equal and reactive to light. Hearing intact. No facial droop noted. Tongue protrudes in the midline. Facial sensation and movement symmetric and normal. Strength equal and normal to bilateral upper and lower extremities. No saccades or nystagmus noted. Normal vestibular ocular reflex. Slight dizziness noted with head turns on fixed point. No symptoms noted with head turns with ambulation. Patient does not display difficulties with tandem stance. He does not display any difficulties with tandem walk. He is able to perform double and single leg stance without difficulty. Negative pronator drift. Negative Romberg's. Patient was able to recall 3 out of the 5 words after a 10 minute pause. Orientation score 5/5, Immediate memory score 15/15, concentration score 4/5, delayed memory score 3/5. Total cognitive inefficiency score 27/30. Negative Meggan's. Negative clonus.\par \par  I have provided the patient with a referral for a psychiatrist. Patient with complaints of panic attacks and anxiety. I have also provided the patient with an Rx for an MRI of the cervical spine due to continued neck pain despite conservative therapies. We will follow-up in 6 weeks to evaluate for progression and to review images. Patient is aware to call with any further questions or concerns or with any new or worsening symptoms.\par \par Addendum 5/2/2022 patient underwent x-ray of the cervical spine at [facility] on 4/19/2022 which indicates marked restriction in cervical flexion and extension.  Patient also underwent MRI of the brain on the same date which indicates normal MRI of the brain.\par \par Addendum 5/12/2022–discussed with Dr. Gonzalez MRI of the brain and xray of the neck.  No concerns at this time.\par   [FreeTextEntry3] : Cassie Drew, MSN, FNP-BC\par Nurse Practitioner\par Neurosurgery\par [address] [place] [address], [address]nd [place] \[place] [address] \par Office: [phone] \par Fax: [phone]\par \par

## 2022-05-16 ENCOUNTER — NON-APPOINTMENT (OUTPATIENT)
Age: 37
End: 2022-05-16

## 2022-06-14 ENCOUNTER — APPOINTMENT (OUTPATIENT)
Dept: OTOLARYNGOLOGY | Facility: CLINIC | Age: 37
End: 2022-06-14
Payer: COMMERCIAL

## 2022-06-14 VITALS
HEIGHT: 68 IN | DIASTOLIC BLOOD PRESSURE: 69 MMHG | HEART RATE: 69 BPM | WEIGHT: 150 LBS | BODY MASS INDEX: 22.73 KG/M2 | SYSTOLIC BLOOD PRESSURE: 113 MMHG

## 2022-06-14 DIAGNOSIS — Z82.49 FAMILY HISTORY OF ISCHEMIC HEART DISEASE AND OTHER DISEASES OF THE CIRCULATORY SYSTEM: ICD-10-CM

## 2022-06-14 DIAGNOSIS — Z87.891 PERSONAL HISTORY OF NICOTINE DEPENDENCE: ICD-10-CM

## 2022-06-14 DIAGNOSIS — H93.292 OTHER ABNORMAL AUDITORY PERCEPTIONS, LEFT EAR: ICD-10-CM

## 2022-06-14 DIAGNOSIS — K52.9 NONINFECTIVE GASTROENTERITIS AND COLITIS, UNSPECIFIED: ICD-10-CM

## 2022-06-14 PROCEDURE — 92557 COMPREHENSIVE HEARING TEST: CPT

## 2022-06-14 PROCEDURE — 92511 NASOPHARYNGOSCOPY: CPT

## 2022-06-14 PROCEDURE — 99203 OFFICE O/P NEW LOW 30 MIN: CPT | Mod: 25

## 2022-06-14 PROCEDURE — 92567 TYMPANOMETRY: CPT

## 2022-06-14 RX ORDER — AZELASTINE HYDROCHLORIDE 137 UG/1
0.1 SPRAY, METERED NASAL
Qty: 30 | Refills: 0 | Status: ACTIVE | COMMUNITY
Start: 2021-12-29

## 2022-06-14 RX ORDER — ALPRAZOLAM 0.25 MG/1
0.25 TABLET ORAL
Qty: 30 | Refills: 0 | Status: ACTIVE | COMMUNITY
Start: 2022-04-26

## 2022-06-14 NOTE — REVIEW OF SYSTEMS
[Seasonal Allergies] : seasonal allergies [Post Nasal Drip] : post nasal drip [Dizziness] : dizziness [Vertigo] : vertigo [Ear Noises] : ear noises [Nasal Congestion] : nasal congestion [Shortness Of Breath] : shortness of breath [Negative] : Heme/Lymph [FreeTextEntry1] : headache,muscle aches

## 2022-06-14 NOTE — DATA REVIEWED
[de-identified] : Tymps: Type A AU\par ETF: ETD AD, WNL AS\par Audio: -8000 Hz AU\par Recs: 1. ENT f/u, 2. Re-eval as per MD

## 2022-06-14 NOTE — HISTORY OF PRESENT ILLNESS
[de-identified] : Taurus Champion is a 38 yo male who presents for evlauation of hearing. He states that on 1/29/22, he fell and struck his head while snowboarding. He did not lose consciousness or have concussion symptoms immediately after this. On 2/3/22, he was snowboarding and fell again. He had a headache. He then had another fall during rock climbing the following week. He then felt that the room did not feel level at that time. He began having nausea and headaches. He again had headaches the following week. He was seen in the ED and CT head was normal. He was noted ot have concussion symptoms. He underwent physical therapy and had some improvement. However, on 5/5/22, he hit his head on a metal railing. He bent over after this and had visual disturbances and headaches. He notes that after this, one day he was sitting at work, and his left ear "cleared." After this, he has had some dysequilibrium. He feels that he has some potential hearing loss in the left ear. He denies otalgia, otorrhea. He notes right aural fullness. He notes chronic intermittent bilateral tinnitus. He denies makayla vertigo. He denies facial weakness or facial numbness. He denies fevers, chills, or history of recurrent ear infections. He denies nasal congestion or drainage.\par \par Of note, he had a MRI brain 4/29/22, which was reported to be normal.

## 2022-06-14 NOTE — ASSESSMENT
[FreeTextEntry1] : Taurus Champion presents for evaluation after concussive symptoms from multiple blunt head traumas earlier this year. He has intermittent dysequilibrium as well as right aural fullness. He had previous MRI brain which was normal per the report in the chart. Otoscopic exam was normal. Audiogram shows type A tymps AU and normal hearing AU. There is right eustachian tube dysfunction which is likely causing his aural fullness. Nasopharyngoscopy was performed showing no obstructive lesion near the eustachian tube orifice. We will also obtain VNG for his dysequilibrium.\par \par \par - Flonase 2 sprays daily to each nostril.\par - VNG\par - Follow up after VNG

## 2022-06-24 ENCOUNTER — APPOINTMENT (OUTPATIENT)
Dept: OTOLARYNGOLOGY | Facility: CLINIC | Age: 37
End: 2022-06-24

## 2022-06-24 ENCOUNTER — APPOINTMENT (OUTPATIENT)
Dept: NEUROSURGERY | Facility: CLINIC | Age: 37
End: 2022-06-24

## 2022-06-24 VITALS
TEMPERATURE: 98 F | BODY MASS INDEX: 22.73 KG/M2 | WEIGHT: 150 LBS | OXYGEN SATURATION: 99 % | HEART RATE: 80 BPM | DIASTOLIC BLOOD PRESSURE: 77 MMHG | HEIGHT: 68 IN | SYSTOLIC BLOOD PRESSURE: 142 MMHG

## 2022-06-24 DIAGNOSIS — M54.2 CERVICALGIA: ICD-10-CM

## 2022-06-24 DIAGNOSIS — R51.9 HEADACHE, UNSPECIFIED: ICD-10-CM

## 2022-06-24 DIAGNOSIS — S06.0X9A CONCUSSION WITH LOSS OF CONSCIOUSNESS OF UNSPECIFIED DURATION, INITIAL ENCOUNTER: ICD-10-CM

## 2022-06-24 PROCEDURE — 92537 CALORIC VSTBLR TEST W/REC: CPT

## 2022-06-24 PROCEDURE — 92540 BASIC VESTIBULAR EVALUATION: CPT

## 2022-06-24 PROCEDURE — 99213 OFFICE O/P EST LOW 20 MIN: CPT | Mod: 1L

## 2022-06-24 RX ORDER — METHOCARBAMOL 500 MG/1
500 TABLET, FILM COATED ORAL EVERY 6 HOURS
Qty: 25 | Refills: 1 | Status: ACTIVE | COMMUNITY
Start: 2022-06-24 | End: 1900-01-01

## 2022-07-07 ENCOUNTER — APPOINTMENT (OUTPATIENT)
Dept: NEUROLOGY | Facility: CLINIC | Age: 37
End: 2022-07-07

## 2022-08-29 ENCOUNTER — APPOINTMENT (OUTPATIENT)
Dept: NEUROLOGY | Facility: CLINIC | Age: 37
End: 2022-08-29

## 2022-09-16 ENCOUNTER — APPOINTMENT (OUTPATIENT)
Dept: OTOLARYNGOLOGY | Facility: CLINIC | Age: 37
End: 2022-09-16

## 2022-09-16 VITALS
HEIGHT: 68 IN | DIASTOLIC BLOOD PRESSURE: 75 MMHG | HEART RATE: 70 BPM | SYSTOLIC BLOOD PRESSURE: 126 MMHG | WEIGHT: 153 LBS | BODY MASS INDEX: 23.19 KG/M2

## 2022-09-16 DIAGNOSIS — R42 DIZZINESS AND GIDDINESS: ICD-10-CM

## 2022-09-16 DIAGNOSIS — H69.81 OTHER SPECIFIED DISORDERS OF EUSTACHIAN TUBE, RIGHT EAR: ICD-10-CM

## 2022-09-16 PROCEDURE — 99213 OFFICE O/P EST LOW 20 MIN: CPT

## 2022-09-16 NOTE — HISTORY OF PRESENT ILLNESS
[de-identified] : Taurus Champion is a 38 yo male who presents for evlauation of hearing. He states that on 1/29/22, he fell and struck his head while snowboarding. He did not lose consciousness or have concussion symptoms immediately after this. On 2/3/22, he was snowboarding and fell again. He had a headache. He then had another fall during rock climbing the following week. He then felt that the room did not feel level at that time. He began having nausea and headaches. He again had headaches the following week. He was seen in the ED and CT head was normal. He was noted ot have concussion symptoms. He underwent physical therapy and had some improvement. However, on 5/5/22, he hit his head on a metal railing. He bent over after this and had visual disturbances and headaches. He notes that after this, one day he was sitting at work, and his left ear "cleared." After this, he has had some dysequilibrium. He feels that he has some potential hearing loss in the left ear. He denies otalgia, otorrhea. He notes right aural fullness. He notes chronic intermittent bilateral tinnitus. He denies makayla vertigo. He denies facial weakness or facial numbness. He denies fevers, chills, or history of recurrent ear infections. He denies nasal congestion or drainage.\par \par Of note, he had a MRI brain 4/29/22, which was reported to be normal. [FreeTextEntry1] : 9/16/22 - Taurus Champion presents for follow-up. Previous VNG suggested central etiology. He is occasionally using flonase for his eustachian tube dysfunction. He had previous MRI brain which was normal. He was seen by a neurologist at [facility] and prescribed medications, but did not take these. He notes that he continues to have intermittent right aural fullness. He had some right otalgia when blowing his nose a few weeks ago. He had another episode of head trauma on Aug 16. He had some dysequilibrium after this. He saw an optometrist and exam was normal. He notes one episode of vertigo when laying down and turning his head to the right after a session of vestibular therapy. He is now seeing a chiropractor. 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

## 2022-09-25 PROBLEM — S06.0X9A CONCUSSION: Status: ACTIVE | Noted: 2022-02-24

## 2022-09-25 PROBLEM — M54.2 NECK PAIN: Status: ACTIVE | Noted: 2022-02-24

## 2022-09-25 PROBLEM — R51.9 HEADACHE: Status: ACTIVE | Noted: 2022-03-25

## 2022-09-25 NOTE — CONSULT LETTER
[Dear  ___] : Dear  [unfilled], [Courtesy Letter:] : I had the pleasure of seeing your patient, [unfilled], in my office today. [Sincerely,] : Sincerely, [FreeTextEntry1] : This very pleasant 37-year-old gentleman who is otherwise healthy has been seen in our office for further evaluation of concussion and persistent neck pain.  As you know the patient is involved in extreme sports including snowboarding and climbing.  He has had multiple injuries to his head in the past as a result of these activities.  He was last seen in our office in May 2022.  He is showing significant improvement with respect to his concussion symptoms.  His last concussion event resulted in hospital evaluation on 13 February 2022.  The patient has undergone x-rays of the cervical spine because of neck pain.  These demonstrated some restriction of neck range of motion but no underlying pathology.  An MRI of the brain was also performed which did not show any evidence of hemorrhage or other lesions.  The patient has now undergone an MRI of the cervical spine and returns to discuss results.\par \par I have reviewed directly with the patient his MRI of the cervical spine.  There is good alignment with normal lordotic curvature.  There are minor disc bulges at several levels but there is no compression of the central canal or the exiting nerve roots.  On STIR sequences there is no inflammatory response to suggest a soft tissue or ligamentous injury.  There is no evidence of instability on dynamic x-rays.\par \par On examination the patient is in no acute distress.  He does have restricted movement of the neck secondary to muscular pain.  There is no pronator drift.  Romberg test is negative.  He has no problems with visual fields.  There is no nystagmus.  There is no difficulty with accommodation.\par \par It is my impression that the patient is having persistent myofascial pain as a result of spasming and due to multiple injuries involving the head and neck.  I have provided a prescription for a muscle relaxant.  I also recommend that the patient would participate in massage therapy and physical therapy to help resolve his chronic symptoms.  No surgical intervention is appropriate.  At this time I have not arranged for any further follow-up but would be pleased to see the patient again at any time should the need arise.\par \par Thank you for kindly including me in the evaluation and support of your patient.  Please do not hesitate to contact me should you have any questions or concerns regarding this evaluation or the patient's ongoing follow-up care. [FreeTextEntry3] : Adolfo Gonzalez MD, PhD, FRCPSC \par Attending Neurosurgeon \par  of Neurosurgery \par [facility] \par [address], [address]nd floor \[place] [address] \par Office: [phone] \par Fax: [phone]\par \par

## 2022-10-06 ENCOUNTER — NON-APPOINTMENT (OUTPATIENT)
Age: 37
End: 2022-10-06

## 2022-10-18 NOTE — ED ADULT TRIAGE NOTE - BSA (M2)
1.8 Hx of HTN. Per surescripts no recent HTN meds. Per HIE in 2020 was taking metoprolol ER 50 and Nifedipine ER 30 in 2020.     - monitor BPs  - consider starting BP meds if becomes hypertensive

## 2023-02-25 ENCOUNTER — NON-APPOINTMENT (OUTPATIENT)
Age: 38
End: 2023-02-25

## 2023-09-05 NOTE — ED PROVIDER NOTE - ENMT NEGATIVE STATEMENT, MLM
Ears: no ear pain and no hearing problems. Nose: no nasal congestion and no nasal drainage. Mouth/Throat: no dysphagia, no hoarseness and no throat pain. Neck: no lumps, no pain, no stiffness and no swollen glands. Thalidomide Counseling: I discussed with the patient the risks of thalidomide including but not limited to birth defects, anxiety, weakness, chest pain, dizziness, cough and severe allergy.

## 2024-01-20 ENCOUNTER — NON-APPOINTMENT (OUTPATIENT)
Age: 39
End: 2024-01-20

## 2024-06-10 ENCOUNTER — NON-APPOINTMENT (OUTPATIENT)
Age: 39
End: 2024-06-10

## 2024-09-09 ENCOUNTER — NON-APPOINTMENT (OUTPATIENT)
Age: 39
End: 2024-09-09

## 2024-09-11 ENCOUNTER — APPOINTMENT (OUTPATIENT)
Dept: UROLOGY | Facility: CLINIC | Age: 39
End: 2024-09-11
Payer: COMMERCIAL

## 2024-09-11 VITALS
SYSTOLIC BLOOD PRESSURE: 121 MMHG | RESPIRATION RATE: 16 BRPM | BODY MASS INDEX: 22.73 KG/M2 | DIASTOLIC BLOOD PRESSURE: 77 MMHG | WEIGHT: 150 LBS | OXYGEN SATURATION: 98 % | HEIGHT: 68 IN | HEART RATE: 62 BPM

## 2024-09-11 DIAGNOSIS — L29.3 ANOGENITAL PRURITUS, UNSPECIFIED: ICD-10-CM

## 2024-09-11 PROCEDURE — 99203 OFFICE O/P NEW LOW 30 MIN: CPT

## 2024-09-11 RX ORDER — METHYLPREDNISOLONE 4 MG/1
4 TABLET ORAL
Qty: 1 | Refills: 0 | Status: ACTIVE | COMMUNITY
Start: 2024-09-11 | End: 1900-01-01

## 2024-09-11 NOTE — HISTORY OF PRESENT ILLNESS
[FreeTextEntry1] : This patient was rockclimbing last weekend and since that time is noted gradual swelling and an increase in pruritus of his penis.  He denies a history of urinary tract issues in the past although he did get an insect bite on his genitalia when he went rockclimbing the previous time.  He denies any discharge or urinary symptoms other than the above.

## 2024-09-11 NOTE — END OF VISIT
normal appearance , no deformities
[FreeTextEntry3] : I called in a Medrol Dosepak for him and instructed him to prepare a solution of to speak teaspoon salt and a quart of water.  He should soak his penis and is 3-4 times a day for approximately 15 minutes.  He will call back with any lack of success

## 2024-09-11 NOTE — PHYSICAL EXAM
[Normal Appearance] : normal appearance [Well Groomed] : well groomed [General Appearance - In No Acute Distress] : no acute distress [Edema] : no peripheral edema [Respiration, Rhythm And Depth] : normal respiratory rhythm and effort [Exaggerated Use Of Accessory Muscles For Inspiration] : no accessory muscle use [Abdomen Soft] : soft [Abdomen Tenderness] : non-tender [Costovertebral Angle Tenderness] : no ~M costovertebral angle tenderness [Urinary Bladder Findings] : the bladder was normal on palpation [Normal Station and Gait] : the gait and station were normal for the patient's age [] : no rash [No Focal Deficits] : no focal deficits [Oriented To Time, Place, And Person] : oriented to person, place, and time [Affect] : the affect was normal [Mood] : the mood was normal [No Palpable Adenopathy] : no palpable adenopathy [de-identified] : The distal penis is edematous and swollen.

## 2024-09-11 NOTE — PHYSICAL EXAM
[Normal Appearance] : normal appearance [Well Groomed] : well groomed [General Appearance - In No Acute Distress] : no acute distress [Edema] : no peripheral edema [Exaggerated Use Of Accessory Muscles For Inspiration] : no accessory muscle use [Respiration, Rhythm And Depth] : normal respiratory rhythm and effort [Abdomen Soft] : soft [Abdomen Tenderness] : non-tender [Costovertebral Angle Tenderness] : no ~M costovertebral angle tenderness [Urinary Bladder Findings] : the bladder was normal on palpation [Normal Station and Gait] : the gait and station were normal for the patient's age [] : no rash [No Focal Deficits] : no focal deficits [Oriented To Time, Place, And Person] : oriented to person, place, and time [Affect] : the affect was normal [Mood] : the mood was normal [No Palpable Adenopathy] : no palpable adenopathy [de-identified] : The distal penis is edematous and swollen.

## 2024-09-11 NOTE — ASSESSMENT
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  It is conceivable that during his climb he had urinated and contaminated his penis with some plant material.

## 2024-09-11 NOTE — PHYSICAL EXAM
[Normal Appearance] : normal appearance [Well Groomed] : well groomed [General Appearance - In No Acute Distress] : no acute distress [Edema] : no peripheral edema [Respiration, Rhythm And Depth] : normal respiratory rhythm and effort [Exaggerated Use Of Accessory Muscles For Inspiration] : no accessory muscle use [Abdomen Soft] : soft [Abdomen Tenderness] : non-tender [Costovertebral Angle Tenderness] : no ~M costovertebral angle tenderness [Urinary Bladder Findings] : the bladder was normal on palpation [Normal Station and Gait] : the gait and station were normal for the patient's age [] : no rash [No Focal Deficits] : no focal deficits [Oriented To Time, Place, And Person] : oriented to person, place, and time [Affect] : the affect was normal [Mood] : the mood was normal [No Palpable Adenopathy] : no palpable adenopathy [de-identified] : The distal penis is edematous and swollen.